# Patient Record
Sex: FEMALE | Race: WHITE | NOT HISPANIC OR LATINO | Employment: FULL TIME | URBAN - METROPOLITAN AREA
[De-identification: names, ages, dates, MRNs, and addresses within clinical notes are randomized per-mention and may not be internally consistent; named-entity substitution may affect disease eponyms.]

---

## 2024-08-01 ENCOUNTER — HOSPITAL ENCOUNTER (EMERGENCY)
Facility: HOSPITAL | Age: 34
Discharge: HOME | End: 2024-08-01
Attending: EMERGENCY MEDICINE
Payer: OTHER MISCELLANEOUS

## 2024-08-01 ENCOUNTER — APPOINTMENT (EMERGENCY)
Dept: RADIOLOGY | Facility: HOSPITAL | Age: 34
End: 2024-08-01
Payer: OTHER MISCELLANEOUS

## 2024-08-01 VITALS
HEART RATE: 90 BPM | WEIGHT: 239.1 LBS | DIASTOLIC BLOOD PRESSURE: 61 MMHG | RESPIRATION RATE: 16 BRPM | OXYGEN SATURATION: 98 % | SYSTOLIC BLOOD PRESSURE: 114 MMHG | TEMPERATURE: 98.8 F
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
## 2024-08-01 DIAGNOSIS — S61.219A FINGER LACERATION: Primary | ICD-10-CM

## 2024-08-01 PROCEDURE — 12001 RPR S/N/AX/GEN/TRNK 2.5CM/<: CPT | Performed by: EMERGENCY MEDICINE

## 2024-08-01 PROCEDURE — 73140 X-RAY EXAM OF FINGER(S): CPT

## 2024-08-01 PROCEDURE — 99284 EMERGENCY DEPT VISIT MOD MDM: CPT | Performed by: EMERGENCY MEDICINE

## 2024-08-01 PROCEDURE — 99283 EMERGENCY DEPT VISIT LOW MDM: CPT

## 2024-08-01 RX ORDER — LIDOCAINE HYDROCHLORIDE AND EPINEPHRINE 10; 10 MG/ML; UG/ML
20 INJECTION, SOLUTION INFILTRATION; PERINEURAL ONCE
Status: COMPLETED | OUTPATIENT
Start: 2024-08-01 | End: 2024-08-01

## 2024-08-01 RX ADMIN — LIDOCAINE HYDROCHLORIDE,EPINEPHRINE BITARTRATE 20 ML: 10; .01 INJECTION, SOLUTION INFILTRATION; PERINEURAL at 13:00

## 2024-08-01 NOTE — Clinical Note
Beverly Craig was seen and treated in our emergency department on 8/1/2024.                Diagnosis:     Beverly  .    She may return on this date: 08/02/2024         If you have any questions or concerns, please don't hesitate to call.      Weston Sanchez, DO    ______________________________           _______________          _______________  Hospital Representative                              Date                                Time

## 2024-08-01 NOTE — ED PROVIDER NOTES
History  Chief Complaint   Patient presents with    Finger Laceration     Index left hand. Caught with a wood pallet < 1hr PTA       Laceration to left 2nd digit, UTD on tetanus per medical records. Caught on pallet       Finger Laceration  Associated symptoms: no fever and no rash        None       History reviewed. No pertinent past medical history.    History reviewed. No pertinent surgical history.    History reviewed. No pertinent family history.  I have reviewed and agree with the history as documented.    E-Cigarette/Vaping     E-Cigarette/Vaping Substances     Social History     Tobacco Use    Smoking status: Never    Smokeless tobacco: Never   Substance Use Topics    Alcohol use: Never    Drug use: Never       Review of Systems   Constitutional: Negative.  Negative for chills and fever.   HENT: Negative.  Negative for rhinorrhea, sore throat, trouble swallowing and voice change.    Eyes: Negative.  Negative for pain and visual disturbance.   Respiratory: Negative.  Negative for cough, shortness of breath and wheezing.    Cardiovascular: Negative.  Negative for chest pain and palpitations.   Gastrointestinal:  Negative for abdominal pain, diarrhea, nausea and vomiting.   Genitourinary: Negative.  Negative for dysuria and frequency.   Musculoskeletal: Negative.  Negative for neck pain and neck stiffness.   Skin:  Positive for wound. Negative for rash.   Neurological: Negative.  Negative for dizziness, speech difficulty, weakness, light-headedness and numbness.       Physical Exam  Physical Exam  Vitals and nursing note reviewed.   Constitutional:       General: She is not in acute distress.     Appearance: She is well-developed.   HENT:      Head: Normocephalic and atraumatic.   Eyes:      Conjunctiva/sclera: Conjunctivae normal.      Pupils: Pupils are equal, round, and reactive to light.   Neck:      Trachea: No tracheal deviation.   Cardiovascular:      Rate and Rhythm: Normal rate and regular rhythm.    Pulmonary:      Effort: Pulmonary effort is normal. No respiratory distress.      Breath sounds: Normal breath sounds. No wheezing or rales.   Abdominal:      General: Bowel sounds are normal. There is no distension.      Palpations: Abdomen is soft.      Tenderness: There is no abdominal tenderness. There is no guarding or rebound.   Musculoskeletal:         General: No tenderness or deformity. Normal range of motion.      Cervical back: Normal range of motion and neck supple.   Skin:     General: Skin is warm and dry.      Capillary Refill: Capillary refill takes less than 2 seconds.      Findings: No rash.          Neurological:      Mental Status: She is alert and oriented to person, place, and time.   Psychiatric:         Behavior: Behavior normal.         Vital Signs  ED Triage Vitals [08/01/24 1236]   Temperature Pulse Respirations Blood Pressure SpO2   98.8 °F (37.1 °C) 90 16 114/61 98 %      Temp Source Heart Rate Source Patient Position - Orthostatic VS BP Location FiO2 (%)   Tympanic Monitor Lying Right arm --      Pain Score       --           Vitals:    08/01/24 1236   BP: 114/61   Pulse: 90   Patient Position - Orthostatic VS: Lying         Visual Acuity      ED Medications  Medications   lidocaine-epinephrine (XYLOCAINE/EPINEPHRINE) 1 %-1:100,000 injection 20 mL (20 mL Infiltration Given by Other 8/1/24 1300)       Diagnostic Studies  Results Reviewed       None                   XR finger second digit-index LEFT   ED Interpretation by Weston Sanchez DO (08/01 1330)   No foreign body for fracture appreciated.      Final Result by Juan Preston MD (08/01 1346)      No acute osseous abnormality. No visible foreign body         Computerized Assisted Algorithm (CAA) may have been used to analyze all applicable images.               Workstation performed: [id number]                    Procedures  Universal Protocol:  Consent: Verbal consent obtained.  Risks and benefits: risks, benefits and  "alternatives were discussed  Consent given by: parent and patient  Time out: Immediately prior to procedure a \"time out\" was called to verify the correct patient, procedure, equipment, support staff and site/side marked as required.  Patient understanding: patient states understanding of the procedure being performed  Patient consent: the patient's understanding of the procedure matches consent given  Procedure consent: procedure consent matches procedure scheduled  Relevant documents: relevant documents present and verified  Test results: test results available and properly labeled  Site marked: the operative site was marked  Radiology Images displayed and confirmed. If images not available, report reviewed: imaging studies available  Patient identity confirmed: arm band  Laceration repair    Date/Time: 8/1/2024 1:45 PM    Performed by: Weston Sanchez DO  Authorized by: Weston Sanchez DO  Body area: upper extremity  Location details: left index finger  Laceration length: 1.5 cm  Foreign bodies: no foreign bodies  Tendon involvement: none  Nerve involvement: none  Vascular damage: no  Anesthesia: digital block    Anesthesia:  Local Anesthetic: lidocaine 1% with epinephrine  Anesthetic total: 6 mL    Sedation:  Patient sedated: no      Wound Dehiscence:  Superficial Wound Dehiscence: simple closure      Procedure Details:  Preparation: Patient was prepped and draped in the usual sterile fashion.  Irrigation solution: saline  Debridement: none  Degree of undermining: none  Skin closure: 4-0 nylon  Number of sutures: 2  Technique: simple  Approximation: close  Approximation difficulty: simple  Dressing: 4x4 sterile gauze  Patient tolerance: patient tolerated the procedure well with no immediate complications               ED Course                                 SBIRT 20yo+      Flowsheet Row Most Recent Value   Initial Alcohol Screen: US AUDIT-C     1. How often do you have a drink containing alcohol? 0 Filed " at: 08/01/2024 1247   2. How many drinks containing alcohol do you have on a typical day you are drinking?  0 Filed at: 08/01/2024 1247   3a. Male UNDER 65: How often do you have five or more drinks on one occasion? 0 Filed at: 08/01/2024 1247   3b. FEMALE Any Age, or MALE 65+: How often do you have 4 or more drinks on one occassion? 0 Filed at: 08/01/2024 1247   Audit-C Score 0 Filed at: 08/01/2024 1247   SHARYN: How many times in the past year have you...    Used an illegal drug or used a prescription medication for non-medical reasons? Never Filed at: 08/01/2024 1247                      Medical Decision Making  Xray negative, laceration repaired, follow up and return precautions reviewed.    Amount and/or Complexity of Data Reviewed  Radiology: ordered and independent interpretation performed.    Risk  Prescription drug management.                 Disposition  Final diagnoses:   Finger laceration   Work related injury     Time reflects when diagnosis was documented in both MDM as applicable and the Disposition within this note       Time User Action Codes Description Comment    8/1/2024  1:38 PM Weston Sanchez Add [S61.219A] Finger laceration     8/1/2024  1:38 PM Weston Sanchez Add [Y99.0] Work related injury           ED Disposition       ED Disposition   Discharge    Condition   Stable    Date/Time   Thu Aug 1, 2024 1678    Comment   Beverly Tammie Kiara discharge to home/self care.                   Follow-up Information       Follow up With Specialties Details Why Contact Info Additional Information    [facility] In 1 week  [address] [place] [id number] [phone] [facility], [address] [place] [address], [id number] [phone]            Patient's Medications    No medications on file       No discharge procedures on file.    PDMP Review       None            ED  Provider  Electronically Signed by             Weston Sanchez DO  08/01/24 1527

## 2024-08-01 NOTE — ED NOTES
Laceration repair to left index finger performed by Dr. Youngblood.  See provider charting     Jevon Christianson RN  08/01/24 2525

## 2024-08-08 ENCOUNTER — OCCMED (OUTPATIENT)
Dept: URGENT CARE | Facility: CLINIC | Age: 34
End: 2024-08-08
Payer: OTHER MISCELLANEOUS
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
## 2024-08-08 PROCEDURE — 99211 OFF/OP EST MAY X REQ PHY/QHP: CPT | Performed by: NURSE PRACTITIONER

## 2024-08-08 PROCEDURE — G0463 HOSPITAL OUTPT CLINIC VISIT: HCPCS | Performed by: NURSE PRACTITIONER
